# Patient Record
Sex: MALE | Race: WHITE
[De-identification: names, ages, dates, MRNs, and addresses within clinical notes are randomized per-mention and may not be internally consistent; named-entity substitution may affect disease eponyms.]

---

## 2018-02-09 ENCOUNTER — HOSPITAL ENCOUNTER (OUTPATIENT)
Dept: HOSPITAL 38 - CC.SDS | Age: 68
End: 2018-02-09
Attending: FAMILY MEDICINE
Payer: MEDICARE

## 2018-02-09 VITALS — SYSTOLIC BLOOD PRESSURE: 143 MMHG | DIASTOLIC BLOOD PRESSURE: 77 MMHG

## 2018-02-09 DIAGNOSIS — Z86.010: ICD-10-CM

## 2018-02-09 DIAGNOSIS — Z12.11: Primary | ICD-10-CM

## 2018-02-09 DIAGNOSIS — I10: ICD-10-CM

## 2018-02-09 DIAGNOSIS — Z79.899: ICD-10-CM

## 2018-02-09 DIAGNOSIS — K57.30: ICD-10-CM

## 2018-02-09 DIAGNOSIS — Z72.0: ICD-10-CM

## 2018-02-09 DIAGNOSIS — R00.1: ICD-10-CM

## 2018-02-09 DIAGNOSIS — Z79.82: ICD-10-CM

## 2018-02-09 DIAGNOSIS — N40.0: ICD-10-CM

## 2018-02-09 DIAGNOSIS — K63.5: ICD-10-CM

## 2018-02-12 NOTE — OR
DATE OF OPERATION:  02/09/2018

 

PREOPERATIVE DIAGNOSIS:  FOLLOW UP POLYPS.

 

POSTOPERATIVE DIAGNOSIS:  FOLLOW UP POLYPS.

 

SURGEON:  Migue Willard MD

 

PROCEDURE:  FULL-LENGTH COLONOSCOPY.

 

ANESTHESIA:  CRNA.

 

COMPLICATIONS:  None.

 

SPECIMEN:  None.

 

FINDINGS:

1. Full-length colonoscopy.

2. Moderate sigmoid diverticulosis.

3. Tubulovillous adenoma, cecum.

 

RECOMMENDATIONS:  It was a difficult patient with a difficult scope.  We were

having a hard time keeping the scope in the cecal pouch to safely remove this

larger villous adenoma.  There was no stalk on this and felt to be safer to

remove via Dr. Yu.

 

INDICATIONS:  The patient had a prior colonoscopy and had a larger villous

adenoma removed in his sigmoid.  He is in for a 2-year follow up.

 

DESCRIPTION OF PROCEDURE:  The patient was prepped and draped, placed in left

lateral decubitus position.  A lubricated Olympus colonoscope was inserted and

relatively easily advanced to the cecum.  The patient does have a long and

tortuous colon.  We were able to get right up to the cecum and visualize greater

than 1 cm tubulovillous adenoma in the cecal pouch, but we had to put him in a

couple of different positions to get the scope into the pouch itself.  We were

having a hard time holding the scope there, to insert snare for retrieval.  The

patient was starting to have some bradycardia due to abdominal pressure, and we

felt unsafe to remove this in [place], and we elected to leave this for a

higher level of care.  The ascending colon was benign.  There were a few small

hyperplastic polyps right at the hepatic flexure, which were left at this time.

Transverse and descending colons unremarkable.  The patient does have scattered

diverticula, moderate in severity, in the sigmoid region without any other

lesions seen.  No vascular abnormalities, signs of colitis, or bleeding sites.

Rectal vault was

benign.  Retroflexion showed no anal lesions.  Air was suctioned and scope was

removed without complication.

 

MARTHA/YOSHI

DD:  02/09/2018 09:42:24

DT:  02/09/2018 15:51:01

Job #:  [id number]

## 2019-03-01 ENCOUNTER — HOSPITAL ENCOUNTER (OUTPATIENT)
Dept: HOSPITAL 38 - CC.SDS | Age: 69
End: 2019-03-01
Attending: FAMILY MEDICINE
Payer: MEDICARE

## 2019-03-01 VITALS — DIASTOLIC BLOOD PRESSURE: 64 MMHG | SYSTOLIC BLOOD PRESSURE: 104 MMHG

## 2019-03-01 DIAGNOSIS — I10: ICD-10-CM

## 2019-03-01 DIAGNOSIS — E78.5: ICD-10-CM

## 2019-03-01 DIAGNOSIS — I48.91: ICD-10-CM

## 2019-03-01 DIAGNOSIS — D12.3: ICD-10-CM

## 2019-03-01 DIAGNOSIS — K57.30: ICD-10-CM

## 2019-03-01 DIAGNOSIS — Z79.899: ICD-10-CM

## 2019-03-01 DIAGNOSIS — D12.2: Primary | ICD-10-CM

## 2019-03-01 DIAGNOSIS — Z86.010: ICD-10-CM

## 2019-03-01 NOTE — OR
DATE OF OPERATION:  03/01/2019

 

PREOPERATIVE DIAGNOSIS:  FOLLOW UP POLYPS.

 

POSTOPERATIVE DIAGNOSIS:  FOLLOW UP POLYPS.

 

SURGEON:  Migue Willard MD

 

PROCEDURE:  DIAGNOSTIC COLONOSCOPY WITH SNARE POLYPECTOMY X2.

 

ANESTHESIA:  CRNA.

 

COMPLICATIONS:  None.

 

SPECIMEN:  Villous adenomas, right colon x2.

 

FINDINGS:

1. Full-length colonoscopy.

2. Mild-to-moderate sigmoid diverticulosis.

3. Villous adenomas, less than a cm, right colon x2.

 

RECOMMENDATIONS:  Followup colonoscopy in 3 years.

 

INDICATIONS:  The patient had a prior colonoscopy with multiple polyps removed

by GI in the right colon including a large one in the cecum.  They recommended a

1-year followup.

 

DESCRIPTION OF PROCEDURE:  The patient was prepped and draped, placed in left

lateral decubitus position.  A lubricated Olympus colonoscope was inserted and

with ease advanced to the cecum.  We were able to directly visualize the

ileocecal valve and appendiceal orifice.  The bowel prep was fine.  Upon

withdrawal, the cecal pouch looked fine, I did not see any polyp recurrence in

that area.  In the proximal ascending colon, the patient had a flat villous

adenoma, approximately a cm in size, removed with a snare, and suctioned into

polyp trap #1.  He had a 2nd villous lesion, smaller, about 0.5 cm in the mid

ascending colon, also removed with a snare and suctioned into polyp trap #2.

The rest of the right and transverse colons were unremarkable.  Left colon had

diffuse diverticulosis, mild-to-moderate in severity, mostly in the sigmoid and

rectosigmoid junction.  No signs of any further polyps, mass, ulceration, or

bleeding sites.  No vascular abnormalities or signs of colitis.  The rectal

vault was unremarkable.

Retroflexion of the scope in the rectum showed no anal lesions.  Air was

suctioned, the scope removed without complication.

 

MARTHA/YOSHI

DD:  03/01/2019 08:50:21

DT:  03/01/2019 12:26:10

Job #:  [id number]

## 2019-04-16 ENCOUNTER — HOSPITAL ENCOUNTER (EMERGENCY)
Dept: HOSPITAL 38 - CC.ED | Age: 69
Discharge: HOME | End: 2019-04-16
Payer: MEDICARE

## 2019-04-16 VITALS — SYSTOLIC BLOOD PRESSURE: 154 MMHG | DIASTOLIC BLOOD PRESSURE: 89 MMHG

## 2019-04-16 DIAGNOSIS — S00.01XA: ICD-10-CM

## 2019-04-16 DIAGNOSIS — R55: Primary | ICD-10-CM
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
## 2019-04-16 DIAGNOSIS — Z79.899: ICD-10-CM

## 2019-04-16 DIAGNOSIS — Z79.82: ICD-10-CM

## 2019-04-16 LAB
CHLORIDE SERPL-SCNC: 103 MEQ/L (ref 98–106)
SODIUM SERPL-SCNC: 141 MEQ/L (ref 136–145)

## 2019-04-16 NOTE — EDM.PDOC
ED HPI GENERAL MEDICAL PROBLEM





- General


Stated Complaint: PASSED OUT


Time Seen by Provider: 04/16/19 12:34





- History of Present Illness


INITIAL COMMENTS - FREE TEXT/NARRATIVE: 





Duane is a 69 year old male who presents to the ED via EMS. Reports he was at 

an auction sale this morning and was standing still for about 1.5 hours. He 

reports his legs started to get weak so he went to sit down on a tractor hitch/

wheel. Reports the next thing he knows he woke up on the ground. Bystanders 

reports brief LOC. He did fall and hit his head and RUE on the tractor. Has 

hematoma to right temporal area and abrasion to RUE. He denies any complaints 

upon presentation to ED. He denies any dizziness or chest pain prior to 

syncopal episode. He denies any headache, pain, N/V/D, chest pain, cough, 

shortness of breath. He reports he is feeling well and has no complaints. 





Patient received 1 L NS fluid bolus enroute via EMS. 








Onset: Today, Sudden


Onset Date: 04/16/19


Onset Time: 11:00


Duration: Resolved Prior to Arrival


Associated Symptoms: Reports: Syncope.  Denies: Confusion, Chest Pain, Cough, 

cough w sputum, Diaphoresis, Fever/Chills, Headaches, Loss of Appetite, Malaise

, Nausea/Vomiting, Rash, Seizure, Shortness of Breath, Weakness


Treatments PTA: Reports: See EMS Report





- Related Data


 Allergies











Allergy/AdvReac Type Severity Reaction Status Date / Time


 


No Known Allergies Allergy   Verified 03/01/19 07:24











Home Meds: 


 Home Meds





Aspirin [Halfprin] 81 mg PO DAILY 04/17/15 [History]


Cranberry Extract [Cranberry] 250 mg PO DAILY 04/17/15 [History]


Metoprolol Tartrate 50 mg PO BID 02/09/18 [History]


Lisinopril/Hydrochlorothiazide [Lisinopril-Hctz 20-25 mg Tab] 1.5 each PO DAILY 

02/28/19 [History]


amLODIPine Besylate [Amlodipine Besylate] 10 mg PO DAILY 02/28/19 [History]











ED ROS GENERAL





- Review of Systems


Review Of Systems: ROS reveals no pertinent complaints other than HPI.





- Physical Exam


Exam: See Below


Exam Limited By: No Limitations


General Appearance: Alert, WD/WN, No Apparent Distress


Eye Exam: Bilateral Eye: EOMI, Normal Fundi, Normal Inspection, PERRL


Ears: Normal External Exam, Normal Canal, Hearing Grossly Normal, Normal TMs


Nose: Normal Inspection, Normal Mucosa, No Blood


Throat/Mouth: Normal Inspection, Normal Lips, Normal Teeth, Normal Gums, Normal 

Oropharynx, Normal Voice, No Airway Compromise


Head Exam: Normocephalic, Scalp Abrasions (left temporal area), Scalp Hematoma (

left temporal area)


Neck: Normal Inspection, Supple, Non-Tender, Full Range of Motion


Respiratory/Chest: No Respiratory Distress, Lungs Clear, Normal Breath Sounds, 

No Accessory Muscle Use, Chest Non-Tender


Cardiovascular: Normal Peripheral Pulses, Regular Rate, Rhythm, No Edema, No 

Gallop, No JVD, No Murmur, No Rub


GI/Abdominal: Normal Bowel Sounds, Soft, Non-Tender, No Organomegaly, No 

Distention, No Abnormal Bruit, No Mass, Hernia (umbilical)


Neuro Exam (Abbreviated): Alert, Oriented, CN II-XII Intact, Normal Cognition, 

Normal Gait, Normal Reflexes, No Motor/Sensory Deficits


Back Exam: Normal Inspection, Full Range of Motion, NT


Extremities: Normal Inspection, Normal Range of Motion, Non-Tender, No Pedal 

Edema, Normal Capillary Refill


Psychiatric: Normal Affect, Normal Mood





Course





- Vital Signs


Last Recorded V/S: 


 Last Vital Signs











Temp  98.9 F   04/16/19 12:10


 


Pulse  73   04/16/19 12:10


 


Resp  20   04/16/19 12:10


 


BP  154/89 H  04/16/19 12:10


 


Pulse Ox  98   04/16/19 12:10














- Orders/Labs/Meds


Orders: 


 Active Orders 24 hr











 Category Date Time Status


 


 Head wo Cont [CT] Stat Exams  04/16/19 12:44 Taken











Labs: 


 Laboratory Tests











  04/16/19 04/16/19 04/16/19 Range/Units





  12:26 12:26 12:26 


 


WBC  12.3 H    (5.0-10.0)  10^3/uL


 


RBC  5.19    (4.50-6.00)  10^6/uL


 


Hgb  15.7    (14.0-18.0)  g/dL


 


Hct  45.7    (40.0-54.0)  %


 


MCV  88.1    (82.0-94.0)  fL


 


MCH  30.3    (27.0-32.0)  pg


 


MCHC  34.4    (33.0-38.0)  g/dL


 


RDW Coeff of Alex  13.5    (11.0-15.0)  %


 


Plt Count  197    (150-400)  10^3/uL


 


Neut % (Auto)  85.6 H    (35-85)  %


 


Lymph % (Auto)  5.9 L    (10-55)  %


 


Mono % (Auto)  6.8    (0-16)  %


 


Eos % (Auto)  1.5    (0-5)  %


 


Baso % (Auto)  0.2    (0-3)  %


 


Neut # (Auto)  10.51 H    (1.80-7.00)  10^3/uL


 


Lymph # (Auto)  0.73 L    (1.00-4.80)  10^3/uL


 


Mono # (Auto)  0.84 H    (0.00-0.80)  10^3/uL


 


Eos # (Auto)  0.18    (0.00-0.45)  10^3/uL


 


Baso # (Auto)  0.02    10^3/uL


 


Sodium   141   (136-145)  mEq/L


 


Potassium   3.5   (3.5-5.0)  mEq/L


 


Chloride   103   ()  mEq/L


 


Carbon Dioxide   30   (21-32)  mmol/L


 


BUN   25 H   (7-18)  mg/dL


 


Creatinine   1.2   (0.7-1.3)  mg/dL


 


Est Cr Clr Drug Dosing   65.66   mL/min


 


Estimated GFR (MDRD)   > 60   (>=60)  mL/min


 


Glucose   119 H   (75-99)  mg/dL


 


Calcium   9.2   (8.4-10.1)  mg/dL


 


Total Bilirubin   0.6   (0.0-1.0)  mg/dL


 


AST   19   (15-37)  U/L


 


ALT   25   (12-78)  U/L


 


Alkaline Phosphatase   72   ()  U/L


 


Troponin I   < 0.017   (0.00-0.06)  ng/mL


 


Total Protein   7.0   (6.4-8.2)  g/dL


 


Albumin   3.5   (3.4-5.0)  g/dL


 


TSH, Ultra Sensitive   3.17   (0.36-5.60)  uIU/mL


 


Urine Color    Yellow  (YELLOW)  


 


Urine Appearance    Clear  (CLEAR)  


 


Urine pH    6.5  (4.5-8.0)  


 


Ur Specific Gravity    1.020  (1.003-1.020)  


 


Urine Protein    Negative  (NEGATIVE)  mg/dL


 


Urine Glucose (UA)    Negative  (NEGATIVE)  mg/dL


 


Urine Ketones    Negative  (NEGATIVE)  mg/dL


 


Urine Occult Blood    Negative  (NEGATIVE)  


 


Urine Nitrite    Negative  (NEGATIVE)  


 


Urine Bilirubin    Negative  (NEGATIVE)  


 


Urine Urobilinogen    0.2  (0.2-1.0)  EU/dL


 


Ur Leukocyte Esterase    Negative  (NEGATIVE)  














- Radiology Interpretation


Free Text/Narrative:: 





Head CT negative for acute changes. 


CT Results Date: 04/16/19


CT Results Time: 14:42





- Re-Assessments/Exams


Free Text/Narrative Re-Assessment/Exam: 





04/16/19 13:45


Discussed lab results with patient and wife. Labs all stable. Awaiting CT 

results. Patient has no complaints and is feeling well. Cardiac monitoring 

remains NSR. 





04/16/19 14:42


Discussed head CT results and discharge recommendations with patient and wife. 

Patient doing well. No complaints. 





Departure





- Departure


Time of Disposition: 14:40


Disposition: Home, Self-Care 01


Condition: Good


Clinical Impression: 


 Vasovagal syncope








- Discharge Information


*PRESCRIPTION DRUG MONITORING PROGRAM REVIEWED*: Not Applicable


*COPY OF PRESCRIPTION DRUG MONITORING REPORT IN PATIENT EMANUEL: Not Applicable


Referrals: 


Cordell Steward PA-C [Primary Care Provider] - 


Forms:  ED Department Discharge


Additional Instructions: 


1) Take it easy the next few days


2) Drink at least 64 ounces water daily


3) Avoid standing still for excessive time periods


4) Follow up with Yosef MAXWELL in next 5-7 days 





- My Orders


Last 24 Hours: 


My Active Orders





04/16/19 12:44


Head wo Cont [CT] Stat 














- Assessment/Plan


Last 24 Hours: 


My Active Orders





04/16/19 12:44


Head wo Cont [CT] Stat 











Plan: 





Patient received 1 L fluid bolus. Was asymptomatic throughout ED stay. 

Discussed with patient and wife that he likely had vasovagal syncopal episode 

as he was standing still for extended period of time. All workup normal. Head 

CT negative. Recommended patient follow up outpatient with PCP for further 

workup in next 5-7 days. Patient and wife verbalized understanding. Patient 

discharged from facility in satisfactory condition. Ambulatory at time of 

discharge.

## 2019-06-06 ENCOUNTER — HOSPITAL ENCOUNTER (OUTPATIENT)
Dept: HOSPITAL 38 - CC.SDS | Age: 69
End: 2019-06-06
Payer: MEDICARE

## 2019-06-06 VITALS — DIASTOLIC BLOOD PRESSURE: 88 MMHG | SYSTOLIC BLOOD PRESSURE: 136 MMHG

## 2019-06-06 DIAGNOSIS — Z86.79: ICD-10-CM

## 2019-06-06 DIAGNOSIS — G56.01: Primary | ICD-10-CM

## 2019-06-06 DIAGNOSIS — Z79.899: ICD-10-CM

## 2019-06-06 DIAGNOSIS — E78.5: ICD-10-CM

## 2019-06-06 DIAGNOSIS — Z79.82: ICD-10-CM

## 2019-06-06 DIAGNOSIS — I10: ICD-10-CM

## 2019-06-06 PROCEDURE — 64721 CARPAL TUNNEL SURGERY: CPT

## 2019-06-06 NOTE — OR
DATE OF OPERATION:  06/06/2019

 

PREOPERATIVE DIAGNOSIS:  RIGHT CARPAL TUNNEL SYNDROME.

 

POSTOPERATIVE DIAGNOSIS:  RIGHT CARPAL TUNNEL SYNDROME.

 

SURGEON:  Tarik Comer MD

 

PROCEDURE:  RELEASE OF RIGHT CARPAL TUNNEL SYNDROME.

 

ANESTHESIA:  [place] block and local anesthesia.

 

SPECIMEN:  None.

 

INDICATIONS:  This 69-year-old male has had bilateral carpal tunnel syndrome and

fairly good relief after the left one has been released.  He now has more

difficulty with the right one.

 

DESCRIPTION OF PROCEDURE:  After adequate preparation, a longitudinal incision

was made along the median palm crease at the wrist and taken down to the

transverse carpal ligament.  This was sharply incised with a 15 blade and using

sharp dissection and a small Metzenbaum scissor, the entire transverse carpal

ligament was transected.  This was then freed up from the median nerve

underneath slightly.  No injury to the nerve was noted.  There was almost no

bleeding.  Local 0.25% Marcaine was used to infiltrate the transverse carpal

ligament.  The wound was closed using interrupted 2-0 nylon sutures, which will

be removed in 2 weeks.  Local was then infiltrated into the skin.

 

BPB/MODL

DD:  06/06/2019 11:37:15

DT:  06/06/2019 17:33:51

Job #:  [id number]

## 2020-01-08 NOTE — EDM.PDOC
ED HPI GENERAL MEDICAL PROBLEM





- General


Chief Complaint: General


Stated Complaint: passed out


Time Seen by Provider: 01/08/20 20:33


Source of Information: Reports: Patient, Family


History Limitations: Reports: No Limitations





- History of Present Illness


INITIAL COMMENTS - FREE TEXT/NARRATIVE: 





Patient presents to ER per EMS after a syncopal episode at home.  Patient had 

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 

sump pump.  Was doing fine at that time but admits was "tired".  Came upstairs 

to take a shower and eat supper.  He was sitting at the table when he told his 

wife he wasn't feeling well.  Was on a stool and leaning down on his elbows.  

Not responding much verbally to wife and arm was shaking.  She proceeded to 

call 911 and while doing so, fell to the floor and was "out" for a few seconds.

  EMS noted patient in atrial fib.  Has history of paroxysmal atrial fib.  Has 

been doing well for over a year, currently not on any medications for it but 

noted to be on metoprolol.  Family reports that was in [facility] back in 

2017 when diagnosed.  Rate was "high but he never even noted it".  





patient currently feels well.  Denies any chest pain, shortness of breath or 

nausea.  No lightheadedness.  Has had a mild URI as of late with a cough.  No 

fevers.  


Onset: Today, Sudden


Duration: Minutes:


Location: Reports: Head


Associated Symptoms: Reports: Cough, Syncope.  Denies: Confusion, Chest Pain, 

Fever/Chills, Loss of Appetite, Nausea/Vomiting, Shortness of Breath





- Related Data


 Allergies











Allergy/AdvReac Type Severity Reaction Status Date / Time


 


No Known Allergies Allergy   Verified 06/06/19 10:11











Home Meds: 


 Home Meds





Aspirin [Halfprin] 81 mg PO DAILY 04/17/15 [History]


Cranberry Fruit Extract [Cranberry] 250 mg PO DAILY 04/17/15 [History]


Metoprolol Tartrate 50 mg PO BID 02/09/18 [History]


Lisinopril/Hydrochlorothiazide [Lisinopril-Hctz 20-25 mg Tab] 1.5 each PO DAILY 

02/28/19 [History]


amLODIPine Besylate [Amlodipine Besylate] 10 mg PO DAILY 02/28/19 [History]











Past Medical History


Cardiovascular History: Reports: Afib, Hypertension


Gastrointestinal History: Reports: Bowel Obstruction





- Infectious Disease History


Infectious Disease History: Reports: None





- Past Surgical History


Male  Surgical History: Reports: Prostatectomy





Social & Family History





- Family History


Family Medical History: Noncontributory





- Tobacco Use


Smoking Status *Q: Never Smoker





- Caffeine Use


Caffeine Use: Reports: None





- Recreational Drug Use


Recreational Drug Use: No





ED ROS GENERAL





- Review of Systems


Review Of Systems: See Below


Constitutional: Reports: Fatigue.  Denies: Fever, Chills, Malaise, Weakness, 

Decreased Appetite


HEENT: Denies: Ear Pain, Rhinitis, Sinus Problem, Throat Pain, Vertigo


Respiratory: Reports: Cough.  Denies: Shortness of Breath


Cardiovascular: Reports: Lightheadedness (resolved).  Denies: Chest Pain, Edema


Endocrine: Reports: Fatigue


GI/Abdominal: Denies: Abdominal Pain, Black Stool, Bloody Stool, Nausea, 

Vomiting


: Reports: No Symptoms


Musculoskeletal: Reports: No Symptoms


Skin: Reports: No Symptoms


Neurological: Reports: Syncope, Weakness.  Denies: Dizziness





ED EXAM, GENERAL





- Physical Exam


Exam: See Below


Exam Limited By: No Limitations


General Appearance: Alert, WD/WN, No Apparent Distress


Eye Exam: Bilateral Eye: EOMI, PERRL


Ears: Normal External Exam, Normal TMs


Nose: Normal Inspection, Normal Mucosa, No Blood


Throat/Mouth: Normal Inspection, Normal Oropharynx


Head: Normocephalic


Neck: Normal Inspection, Supple, Non-Tender


Respiratory/Chest: No Respiratory Distress, Lungs Clear, Normal Breath Sounds


Cardiovascular: Irregularly Irregular


GI/Abdominal: Normal Bowel Sounds, Soft, Non-Tender


Extremities: Normal Inspection, No Pedal Edema


Neurological: Alert, Oriented


Skin Exam: Warm, Dry





Course





- Vital Signs


Last Recorded V/S: 





 Last Vital Signs











Temp  97.2 F   01/08/20 20:07


 


Pulse  82   01/08/20 20:52


 


Resp  18   01/08/20 20:52


 


BP  103/66   01/08/20 20:52


 


Pulse Ox  95   01/08/20 20:52














- Orders/Labs/Meds


Orders: 





 Active Orders 24 hr











 Category Date Time Status


 


 EKG Documentation Completion [RC] STAT Care  01/08/20 20:19 Active


 


 Chest 2V [CR] Stat Exams  01/08/20 20:19 Taken


 


 Head wo Cont [CT] Stat Exams  01/08/20 20:19 Taken


 


 UA W/MICROSCOPIC [URIN] Stat Lab  01/08/20 21:03 Ordered











Labs: 





 Laboratory Tests











  01/08/20 01/08/20 01/08/20 Range/Units





  20:32 20:32 20:32 


 


WBC  14.3 H    (5.0-10.0)  10^3/uL


 


RBC  5.11    (4.50-6.00)  10^6/uL


 


Hgb  15.4    (14.0-18.0)  g/dL


 


Hct  44.5    (40.0-54.0)  %


 


MCV  87.1    (82.0-94.0)  fL


 


MCH  30.1    (27.0-32.0)  pg


 


MCHC  34.6    (33.0-38.0)  g/dL


 


RDW Coeff of Alex  13.4    (11.0-15.0)  %


 


Plt Count  249    (150-400)  10^3/uL


 


Neut % (Auto)  83.0    (35-85)  %


 


Lymph % (Auto)  9.7 L    (10-55)  %


 


Mono % (Auto)  5.5    (0-16)  %


 


Eos % (Auto)  1.5    (0-5)  %


 


Baso % (Auto)  0.3    (0-3)  %


 


Neut # (Auto)  11.86 H    (1.80-7.00)  10^3/uL


 


Lymph # (Auto)  1.38    (1.00-4.80)  10^3/uL


 


Mono # (Auto)  0.79    (0.00-0.80)  10^3/uL


 


Eos # (Auto)  0.21    (0.00-0.45)  10^3/uL


 


Baso # (Auto)  0.04    10^3/uL


 


PT   10.4   (9.7-12.3)  SEC


 


INR   1.01   (0.92-1.18)  


 


APTT   23.8   (23.2-32.3)  SEC


 


Sodium    144  (136-145)  mEq/L


 


Potassium    3.0 L  (3.5-5.0)  mEq/L


 


Chloride    105  ()  mEq/L


 


Carbon Dioxide    27  (21-32)  mmol/L


 


BUN    35 H D  (7-18)  mg/dL


 


Creatinine    1.7 H  (0.7-1.3)  mg/dL


 


Est Cr Clr Drug Dosing    46.35  mL/min


 


Estimated GFR (MDRD)    40 L  (>=60)  mL/min


 


Glucose    171 H D  (75-99)  mg/dL


 


Calcium    9.2  (8.4-10.1)  mg/dL


 


Total Bilirubin    0.5  (0.0-1.0)  mg/dL


 


AST    23  (15-37)  U/L


 


ALT    21  (12-78)  U/L


 


Alkaline Phosphatase    62  ()  U/L


 


Lactate Dehydrogenase    158  (100-190)  U/L


 


Creatine Kinase    226  ()  U/L


 


Troponin I    < 0.017  (0.00-0.06)  ng/mL


 


Total Protein    6.5  (6.4-8.2)  g/dL


 


Albumin    3.3 L  (3.4-5.0)  g/dL


 


Lipase    129  ()  U/L











Meds: 





Medications














Discontinued Medications














Generic Name Dose Route Start Last Admin





  Trade Name Freq  PRN Reason Stop Dose Admin


 


Aspirin  324 mg  01/08/20 20:19  





  Aspirin  PO  01/08/20 20:20  





  ONETIME ONE   





     





     





     





     














- Re-Assessments/Exams


Free Text/Narrative Re-Assessment/Exam: 





01/08/20 2100


Patient's labs noted.  WBC is elevated at 14.3  Chest xray is normal.  Will 

obtain UA.  No fevers.  Potassium 3.0, BUN and creatinine are high, start IV 

fluids with potassium.  Admit to observation.  Follow telemetry.  Repeat 

enzymes in am.  





Departure





- Departure


Time of Disposition: 21:32


Disposition: Refer to Observation


Condition: Fair


Clinical Impression: 


Syncope


Qualifiers:


 Encounter type: initial encounter 








- Discharge Information


*PRESCRIPTION DRUG MONITORING PROGRAM REVIEWED*: Not Applicable


*COPY OF PRESCRIPTION DRUG MONITORING REPORT IN PATIENT EMANUEL: Not Applicable


Referrals: 


PCP,None [Primary Care Provider] - 





Sepsis Event Note





- Evaluation


Sepsis Screening Result: No Definite Risk





- Focused Exam


Vital Signs: 





 Vital Signs











  Temp Pulse Resp BP Pulse Ox


 


 01/08/20 20:52   82  18  103/66  95


 


 01/08/20 20:37   82  16  94/60  96


 


 01/08/20 20:22   83  16  100/66  95


 


 01/08/20 20:07  97.2 F  95  19  110/65  95











Date Exam was Performed: 01/08/20


Time Exam was Performed: 21:13





- Problem List & Annotations


(1) Syncope


SNOMED Code(s): 004367670


   Code(s): R55 - SYNCOPE AND COLLAPSE   Status: Acute   Priority: High   

Current Visit: Yes   


Qualifiers: 


   Encounter type: initial encounter 





- Problem List Review


Problem List Initiated/Reviewed/Updated: Yes





- My Orders


Last 24 Hours: 





My Active Orders





01/08/20 20:19


EKG Documentation Completion [RC] STAT 


Chest 2V [CR] Stat 


Head wo Cont [CT] Stat 





01/08/20 21:03


UA W/MICROSCOPIC [URIN] Stat 














- Assessment/Plan


Admission H&P: Please use this note as an admission H&P


Last 24 Hours: 





My Active Orders





01/08/20 20:19


EKG Documentation Completion [RC] STAT 


Chest 2V [CR] Stat 


Head wo Cont [CT] Stat 





01/08/20 21:03


UA W/MICROSCOPIC [URIN] Stat 











Assessment:: 





Syncope





Plan: 





Patient admitted observation.  Start IV fluids with potassium.  Cardiac 

monitoring.  Repeat labs in am.

## 2020-01-09 NOTE — PCM.DCSUM1
**Discharge Summary





- Hospital Course


Free Text/Narrative:: 





Duane is a 69 year old male who presented to ER after a syncopal episode at 

home.  Had been working down in his basement, chiseling cement.  Had come up to 

shower and was sitting at the table eating supper when wife noted he got pale, 

wasn't responding as well.  Stated wasn't feeling well.  Laid his head down on 

the table and when she reached the phone to call 911, he fell to the floor and 

was unresponsive for several seconds.  He awoke without incident, no head 

trauma.  States felt fine when he awoke.  No loss of urine or bowel.  Admits 

that his hand was shaking and his breathing was sonorous for a few minutes but 

oriented upon awakening.  EMS on arrival did note heart rate of 85, atrial fib.

  Patient has history of paroxysmal atrial fib, was transferred in 2017 with 

first onset of it at that time.  Had taken Xarelto for a short time but wore a 

monitor and was more consistently back in sinus rhythm so went off Xarelto and 

has been taking aspirin.  Labs in ER negative.  EKG atrial fib with controlled 

rate.  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  Admitted for observation, 

neurology consult.  Per neurologist, recommended treating atrial fib and 

obtaining CTA of head and neck and MRI of brain as did not feel CT scan was 

conclusive for CVA.  Neurological exam is normal, NIH score 0.


Diagnosis: Stroke: No


Modified Kandis Scale: No Symptoms at All


Modified Kandis Scale Score: 0





- Discharge Data


Discharge Date: 01/09/20


Discharge Disposition: Home, Self-Care 01


Condition: Good





- Referral to Home Health


Primary Care Physician: 


PCP None








- Discharge Diagnosis/Problem(s)


(1) Syncope


SNOMED Code(s): 842349086


   ICD Code: R55 - SYNCOPE AND COLLAPSE   Status: Acute   Priority: High   


Qualifiers: 


   Encounter type: initial encounter 





- Patient Summary/Data


Complications: none


Hospital Course: 





Patient doing well.  Did convert back to NSR.  Continues on Eliquis at this time

, metoprolol to control rate.  Had CTAs done this am.  Neurological exam has 

remained normal.  Blood pressure in good control.  Ambulating, eating well.  

Will discharge home, return tomorrow for MRI.  Follow up with Yosef next week 

for all results.





- Patient Instructions


Diet: Usual Diet as Tolerated


Activity: As Tolerated





- Discharge Plan


*PRESCRIPTION DRUG MONITORING PROGRAM REVIEWED*: Not Applicable


*COPY OF PRESCRIPTION DRUG MONITORING REPORT IN PATIENT EMANUEL: Not Applicable


Prescriptions/Med Rec: 


Apixaban [Eliquis] 5 mg PO BID #60 tablet


Potassium Chloride 10 meq PO DAILY #30 tablet.er


Home Medications: 


 Home Meds





Aspirin [Halfprin] 81 mg PO DAILY 04/17/15 [History]


Cranberry Fruit Extract [Cranberry] 250 mg PO DAILY 04/17/15 [History]


Metoprolol Tartrate 50 mg PO BID 02/09/18 [History]


Lisinopril/Hydrochlorothiazide [Lisinopril-Hctz 20-25 mg Tab] 1.5 each PO DAILY 

02/28/19 [History]


amLODIPine Besylate [Amlodipine Besylate] 10 mg PO DAILY 02/28/19 [History]


Apixaban [Eliquis] 5 mg PO BID #60 tablet 01/09/20 [Rx]


Potassium Chloride 10 meq PO DAILY #30 tablet.er 01/09/20 [Rx]








Patient Handouts:  Hypokalemia, Syncope


Forms:  ED Department Discharge


Referrals: 


Cordell Steward PA-C [Physician Assistant] - 


(Hospital follow up in one week with Yosef Steward.  Repeat labs prior to 

appointment


)





- Discharge Summary/Plan Comment


DC Time >30 min.: No





- General Info


Date of Service: 01/09/20


Admission Dx/Problem (Free Text: 





Syncope


Atrial Fib


Functional Status: Reports: Pain Controlled, Tolerating Diet, Ambulating





- Review of Systems


General: Reports: No Symptoms


HEENT: Reports: No Symptoms


Pulmonary: Denies: Shortness of Breath


Cardiovascular: Denies: Chest Pain, Edema, Lightheadedness


Gastrointestinal: Denies: Abdominal Pain, Nausea, Vomiting


Genitourinary: Reports: No Symptoms


Musculoskeletal: Reports: No Symptoms


Skin: Reports: No Symptoms


Neurological: Denies: Dizziness, Headache, Syncope, Weakness


Psychiatric: Reports: No Symptoms





- Patient Data


Vitals - Most Recent: 


 Last Vital Signs











Temp  97.9 F   01/09/20 08:00


 


Pulse  75   01/09/20 08:13


 


Resp  18   01/09/20 08:00


 


BP  125/72   01/09/20 08:13


 


Pulse Ox  96   01/09/20 08:00











Weight - Most Recent: 245 lb 11.2 oz


I&O - Last 24 hours: 


 Intake & Output











 01/08/20 01/09/20 01/09/20





 22:59 06:59 14:59


 


Intake Total  969 


 


Balance  969 











Lab Results - Last 24 hrs: 


 Laboratory Results - last 24 hr











  01/08/20 01/08/20 01/08/20 Range/Units





  20:32 20:32 20:32 


 


WBC  14.3 H    (5.0-10.0)  10^3/uL


 


RBC  5.11    (4.50-6.00)  10^6/uL


 


Hgb  15.4    (14.0-18.0)  g/dL


 


Hct  44.5    (40.0-54.0)  %


 


MCV  87.1    (82.0-94.0)  fL


 


MCH  30.1    (27.0-32.0)  pg


 


MCHC  34.6    (33.0-38.0)  g/dL


 


RDW Coeff of Alex  13.4    (11.0-15.0)  %


 


Plt Count  249    (150-400)  10^3/uL


 


Neut % (Auto)  83.0    (35-85)  %


 


Lymph % (Auto)  9.7 L    (10-55)  %


 


Mono % (Auto)  5.5    (0-16)  %


 


Eos % (Auto)  1.5    (0-5)  %


 


Baso % (Auto)  0.3    (0-3)  %


 


Neut # (Auto)  11.86 H    (1.80-7.00)  10^3/uL


 


Lymph # (Auto)  1.38    (1.00-4.80)  10^3/uL


 


Mono # (Auto)  0.79    (0.00-0.80)  10^3/uL


 


Eos # (Auto)  0.21    (0.00-0.45)  10^3/uL


 


Baso # (Auto)  0.04    10^3/uL


 


PT   10.4   (9.7-12.3)  SEC


 


INR   1.01   (0.92-1.18)  


 


APTT   23.8   (23.2-32.3)  SEC


 


Sodium    144  (136-145)  mEq/L


 


Potassium    3.0 L  (3.5-5.0)  mEq/L


 


Chloride    105  ()  mEq/L


 


Carbon Dioxide    27  (21-32)  mmol/L


 


BUN    35 H D  (7-18)  mg/dL


 


Creatinine    1.7 H  (0.7-1.3)  mg/dL


 


Est Cr Clr Drug Dosing    46.35  mL/min


 


Estimated GFR (MDRD)    40 L  (>=60)  mL/min


 


Glucose    171 H D  (75-99)  mg/dL


 


Calcium    9.2  (8.4-10.1)  mg/dL


 


Total Bilirubin    0.5  (0.0-1.0)  mg/dL


 


AST    23  (15-37)  U/L


 


ALT    21  (12-78)  U/L


 


Alkaline Phosphatase    62  ()  U/L


 


Lactate Dehydrogenase    158  (100-190)  U/L


 


Creatine Kinase    226  ()  U/L


 


Troponin I    < 0.017  (0.00-0.06)  ng/mL


 


C-Reactive Protein     (0.2-0.8)  mg/dL


 


Total Protein    6.5  (6.4-8.2)  g/dL


 


Albumin    3.3 L  (3.4-5.0)  g/dL


 


Lipase    129  ()  U/L


 


Urine Color     (YELLOW)  


 


Urine Appearance     (CLEAR)  


 


Urine pH     (4.5-8.0)  


 


Ur Specific Gravity     (1.003-1.020)  


 


Urine Protein     (NEGATIVE)  mg/dL


 


Urine Glucose (UA)     (NEGATIVE)  mg/dL


 


Urine Ketones     (NEGATIVE)  mg/dL


 


Urine Occult Blood     (NEGATIVE)  


 


Urine Nitrite     (NEGATIVE)  


 


Urine Bilirubin     (NEGATIVE)  


 


Urine Urobilinogen     (0.2-1.0)  EU/dL


 


Ur Leukocyte Esterase     (NEGATIVE)  


 


Urine RBC     (0-5)  /HPF


 


Urine WBC     (0-5)  /HPF


 


Hyaline Casts     (NOT SEEN)  /LPF


 


Urine Mucus     (NOT SEEN)  /HPF














  01/08/20 01/09/20 01/09/20 Range/Units





  21:03 07:10 07:10 


 


WBC   7.1   (5.0-10.0)  10^3/uL


 


RBC   4.76   (4.50-6.00)  10^6/uL


 


Hgb   14.3   (14.0-18.0)  g/dL


 


Hct   41.9   (40.0-54.0)  %


 


MCV   88.0   (82.0-94.0)  fL


 


MCH   30.0   (27.0-32.0)  pg


 


MCHC   34.1   (33.0-38.0)  g/dL


 


RDW Coeff of Alex   13.4   (11.0-15.0)  %


 


Plt Count   215   (150-400)  10^3/uL


 


Neut % (Auto)   67.2   (35-85)  %


 


Lymph % (Auto)   21.4   (10-55)  %


 


Mono % (Auto)   8.5   (0-16)  %


 


Eos % (Auto)   2.5   (0-5)  %


 


Baso % (Auto)   0.4   (0-3)  %


 


Neut # (Auto)   4.77   (1.80-7.00)  10^3/uL


 


Lymph # (Auto)   1.52   (1.00-4.80)  10^3/uL


 


Mono # (Auto)   0.60   (0.00-0.80)  10^3/uL


 


Eos # (Auto)   0.18   (0.00-0.45)  10^3/uL


 


Baso # (Auto)   0.03   10^3/uL


 


PT     (9.7-12.3)  SEC


 


INR     (0.92-1.18)  


 


APTT     (23.2-32.3)  SEC


 


Sodium    143  (136-145)  mEq/L


 


Potassium    3.2 L  (3.5-5.0)  mEq/L


 


Chloride    108 H  ()  mEq/L


 


Carbon Dioxide    28  (21-32)  mmol/L


 


BUN    28 H  (7-18)  mg/dL


 


Creatinine    1.3  (0.7-1.3)  mg/dL


 


Est Cr Clr Drug Dosing    60.61  mL/min


 


Estimated GFR (MDRD)    55 L  (>=60)  mL/min


 


Glucose    94  D  (75-99)  mg/dL


 


Calcium    8.5  (8.4-10.1)  mg/dL


 


Total Bilirubin     (0.0-1.0)  mg/dL


 


AST     (15-37)  U/L


 


ALT     (12-78)  U/L


 


Alkaline Phosphatase     ()  U/L


 


Lactate Dehydrogenase     (100-190)  U/L


 


Creatine Kinase     ()  U/L


 


Troponin I    < 0.017  (0.00-0.06)  ng/mL


 


C-Reactive Protein    < 0.2 L  (0.2-0.8)  mg/dL


 


Total Protein     (6.4-8.2)  g/dL


 


Albumin     (3.4-5.0)  g/dL


 


Lipase     ()  U/L


 


Urine Color  Yellow    (YELLOW)  


 


Urine Appearance  Clear    (CLEAR)  


 


Urine pH  5.5    (4.5-8.0)  


 


Ur Specific Gravity  1.025 H    (1.003-1.020)  


 


Urine Protein  Trace H    (NEGATIVE)  mg/dL


 


Urine Glucose (UA)  Negative    (NEGATIVE)  mg/dL


 


Urine Ketones  Trace H    (NEGATIVE)  mg/dL


 


Urine Occult Blood  Negative    (NEGATIVE)  


 


Urine Nitrite  Negative    (NEGATIVE)  


 


Urine Bilirubin  Negative    (NEGATIVE)  


 


Urine Urobilinogen  0.2    (0.2-1.0)  EU/dL


 


Ur Leukocyte Esterase  Negative    (NEGATIVE)  


 


Urine RBC  Not seen    (0-5)  /HPF


 


Urine WBC  0-5    (0-5)  /HPF


 


Hyaline Casts  Few H    (NOT SEEN)  /LPF


 


Urine Mucus  Few H    (NOT SEEN)  /HPF











Med Orders - Current: 


 Current Medications





Amlodipine Besylate (Norvasc)  10 mg PO DAILY [facility]


   Last Admin: 01/09/20 08:12 Dose:  Not Given


Apixaban (Eliquis)  5 mg PO BID [facility]


   Last Admin: 01/09/20 08:14 Dose:  5 mg


Aspirin (Halfprin)  81 mg PO DAILY [facility]


   Last Admin: 01/09/20 08:15 Dose:  81 mg


Potassium Chloride/Sodium Chloride (Normal Saline With 20 Meq Kcl)  1,000 mls @ 

125 mls/hr IV ASDIRECTED [facility]


   Last Admin: 01/09/20 05:41 Dose:  125 mls/hr


Metoprolol Tartrate (Lopressor)  50 mg PO BID [facility]


   Last Admin: 01/09/20 08:13 Dose:  50 mg


Lisinopril- Hydrochlorothiazide 20-25mg**Own Med**  1.5 each PO DAILY [facility]


   Last Admin: 01/09/20 08:12 Dose:  1.5 each


Ondansetron HCl (Zofran Odt)  4 mg PO Q4H PRN


   PRN Reason: nausea, able to take PO





Discontinued Medications





Amlodipine Besylate (Norvasc)  10 mg PO DAILY [facility]


Aspirin (Aspirin)  324 mg PO ONETIME ONE


   Stop: 01/08/20 20:20


   Last Admin: 01/08/20 20:19 Dose:  324 mg


Enoxaparin Sodium (Lovenox)  40 mg SUBCUT Q24H [facility]


   Last Admin: 01/08/20 21:58 Dose:  40 mg


Hydrochlorothiazide (Hydrochlorothiazide)  37.5 mg PO DAILY [facility]


Iopamidol (Isovue-370 (76%))  100 ml IVPUSH ONETIME ONE


   Stop: 01/09/20 07:49


   Last Admin: 01/09/20 08:36 Dose:  100 ml


Lisinopril (Prinivil)  30 mg PO DAILY [facility]


Metoprolol Tartrate (Lopressor)  50 mg PO BID [facility]











- Exam


General: Reports: Alert, Oriented


HEENT: Reports: Mucous Membr. Moist/Pink


Neck: Reports: Supple


Lungs: Reports: Clear to Auscultation, Normal Respiratory Effort


Cardiovascular: Reports: Regular Rate, Regular Rhythm


GI/Abdominal Exam: Normal Bowel Sounds, Soft, Non-Tender


Extremities: Normal Inspection, No Pedal Edema


Skin: Reports: Warm, Dry


Neurological: Reports: No New Focal Deficit

## 2020-05-07 NOTE — OR
DATE OF OPERATION:  05/07/2020

 

PREOPERATIVE DIAGNOSIS:

1. GASTROESOPHAGEAL REFLUX DISEASE.

2. IRREGULAR HEART TRACING.

 

POSTOPERATIVE DIAGNOSIS:

1. GASTROESOPHAGEAL REFLUX DISEASE.

2. IRREGULAR HEART TRACING.

 

SURGEON:  Tarik Comer MD

 

PROCEDURE:  ESOPHAGOGASTRODUODENOSCOPY.

 

ANESTHESIA:  Conscious sedation with IV Versed.

 

SPECIMEN:  None.

 

FINDINGS:  A 2 to 3 cm hiatal hernia.  A few scattered mid esophageal varices,

otherwise normal.

 

RECOMMENDATIONS:  Follow up for reflux as needed.

 

INDICATIONS:  This 70-year-old male has an unusual symptom in that he has a

cardiac monitor placement, and while eating, this sometimes causes some longer

heart pauses during swallowing.  Cardiology has requested an EGD to make sure

that there is no difficulty with the esophagus causing these cardiac changes

anyway and he also does have some reflux symptoms.

 

DESCRIPTION OF PROCEDURE:  After adequate preparation, a gastroscope was

inserted into the esophagus.  This was passed down to the distal esophagus.  It

shows a 2 to 3 cm hiatal hernia with some eversion of gastric mucosa up into the

hernia sac.  There was no evidence of distal esophagitis, masses, bleeding

sites, tumors, or ulcerations.  The scope was advanced into the stomach.  Both

forward and retroflexed views were done and were normal.  The scope was advanced

through the pylorus, and the first and second parts of the duodenum were also

normal.  Air was suctioned from the stomach and the scope removed.  On

withdrawal of the scope, there was some visible varicosities in the mid

esophagus.  These probably are not significant at all.  There was no distal

esophageal varices, which would be more common.

In any event, I did not find any evidence of structural abnormalities that would

be causing the cardiac symptoms.

 

BPB/MODL

DD:  05/07/2020 08:26:28

DT:  05/07/2020 14:12:06

Job #:  [id number]

## 2021-10-24 NOTE — EDM.PDOC
ED HPI GENERAL MEDICAL PROBLEM





- General


Chief Complaint: Syncope


Stated Complaint: near syncopal event


Time Seen by Provider: 10/24/21 11:08


Source of Information: Reports: Patient, Family


History Limitations: Reports: No Limitations





- History of Present Illness


INITIAL COMMENTS - FREE TEXT/NARRATIVE: 





Duane is a 71 year old male who presents to ER with a near syncopal episode.  

Was standing at [employer], became diaphoretic, color turned "greyish" and felt 

lightheaded.  Has had episodes like this in the past and was found to have 

atrial fib.  Per his pacemaker, was told by Dr. Cantrell that has paroxysmal atrial 

fib and is in it about 10% of the time.  He is not typically aware of when this 

occurs.   No shortness of breath. No chest pain.  Does feel much better now.  

Wife states his color has significantly improved.  Did eat breakfast this am.  

Relates was hot in the [employer] today as well but has been drinking fluids.  


Onset: Today, Sudden


Duration: Minutes:, Improving


Location: Reports: Generalized


Improves with: Reports: Rest


Associated Symptoms: Reports: Diaphoresis, Syncope, Weakness.  Denies: 

Confusion, Chest Pain, Fever/Chills, Headaches, Malaise, Nausea/Vomiting, 

Shortness of Breath





- Related Data


                                    Allergies











Allergy/AdvReac Type Severity Reaction Status Date / Time


 


No Known Allergies Allergy   Verified 10/24/21 11:06











Home Meds: 


                                    Home Meds





Cranberry Fruit Extract [Cranberry] 250 - 500 mg PO DAILY 04/17/15 [History]


Metoprolol Tartrate 100 mg PO BID 02/09/18 [History]


Lisinopril/Hydrochlorothiazide [Lisinopril-Hctz 20-25 mg Tab] 1.5 tab PO DAILY 

02/28/19 [History]


amLODIPine Besylate [Amlodipine Besylate] 10 mg PO DAILY 02/28/19 [History]


Apixaban [Eliquis] 5 mg PO BID #60 tablet 01/09/20 [Rx]


Potassium Chloride 10 meq PO DAILY #30 tablet.er 01/09/20 [Rx]











Past Medical History


Cardiovascular History: Reports: Afib, Hypertension


Gastrointestinal History: Reports: Bowel Obstruction





- Infectious Disease History


Infectious Disease History: Reports: None





- Past Surgical History


Male  Surgical History: Reports: Prostatectomy





Social & Family History





- Family History


Family Medical History: No Pertinent Family History





- Tobacco Use


Tobacco Use Status *Q: Unknown Ever Used Tobacco





- Caffeine Use


Caffeine Use: Reports: None





ED ROS GENERAL





- Review of Systems


Review Of Systems: See Below


Constitutional: Denies: Fever, Chills, Malaise, Weakness, Fatigue


HEENT: Reports: Throat Pain.  Denies: Ear Pain, Rhinitis, Sinus Problem, Vertigo


Respiratory: Reports: Cough.  Denies: Shortness of Breath


Cardiovascular: Reports: Edema, Lightheadedness.  Denies: Chest Pain


Endocrine: Denies: Fatigue


GI/Abdominal: Denies: Abdominal Pain, Constipation, Diarrhea, Nausea, Vomiting


: Reports: No Symptoms


Musculoskeletal: Reports: No Symptoms


Skin: Reports: Diaphoresis


Neurological: Reports: Syncope.  Denies: Confusion, Weakness





- Physical Exam


Exam: See Below


Exam Limited By: No Limitations


General Appearance: Alert, WD/WN, No Apparent Distress


Eye Exam: Bilateral Eye: EOMI, PERRL


Ears: Normal External Exam, Normal TMs


Nose: Normal Inspection, Normal Mucosa, No Blood


Throat/Mouth: Normal Inspection, Normal Oropharynx


Head Exam: Normocephalic


Neck: Normal Inspection, Supple, Non-Tender


Respiratory/Chest: No Respiratory Distress, Lungs Clear, Normal Breath Sounds


Cardiovascular: Irregularly Irregular


GI/Abdominal: Normal Bowel Sounds, Soft, Non-Tender


Neuro Exam (Abbreviated): Alert, Oriented, CN II-XII Intact, Normal Cognition, 

Normal Gait, Normal Reflexes, No Motor/Sensory Deficits


Extremities: Normal Inspection, Pedal Edema (1+)


Skin Exam: Warm, Dry


  ** #1 Interpretation


EKG Date: 10/24/21


Rhythm: A-Fib


P-Wave: Absent


QRS: Normal


ST-T: Normal


Comparison: Change From Previous EKG





Course





- Vital Signs


Last Recorded V/S: 


                                Last Vital Signs











Temp  97.8 F   10/24/21 11:08


 


Pulse  69   10/24/21 11:08


 


Resp  16   10/24/21 11:08


 


BP  114/79   10/24/21 11:08


 


Pulse Ox  95   10/24/21 11:08














- Orders/Labs/Meds


Orders: 


                               Active Orders 24 hr











 Category Date Time Status


 


 Chest 2V [CR] Stat Exams  10/24/21 11:11 Taken


 


 Head wo Cont [CT] Stat Exams  10/24/21 11:11 Taken











Labs: 


                                Laboratory Tests











  10/24/21 10/24/21 10/24/21 Range/Units





  11:10 11:10 11:10 


 


WBC  4.3    (4.0-11.0)  10^3/uL


 


RBC  5.06    (4.50-6.00)  x10^6/uL


 


Hgb  15.5    (14.0-18.0)  g/dL


 


Hct  45.3    (42.0-52.0)  %


 


MCV  89.5    (83.0-97.0)  fL


 


MCH  30.6    (27.0-32.0)  pg


 


MCHC  34.2    (32.0-36.0)  g/dL


 


RDW Coeff of Alex  13.1    (11.0-15.0)  %


 


Plt Count  151    (150-400)  10^3/uL


 


Immature Gran % (Auto)  0.2    (0.0-4.9)  %


 


Neut % (Auto)  62.7    (41-71)  %


 


Lymph % (Auto)  18.0 L    (24-44)  %


 


Mono % (Auto)  16.4 H    (0-10)  %


 


Eos % (Auto)  1.8    (0-6)  %


 


Baso % (Auto)  0.9    (0-1)  %


 


Neut # (Auto)  2.72    (1.80-8.00)  x10^3/uL


 


Lymph # (Auto)  0.78    (0.60-5.00)  10^3/uL


 


Mono # (Auto)  0.71    (0.00-1.50)  10^3/uL


 


Eos # (Auto)  0.08    (0.00-1.50)  10^3/uL


 


Baso # (Auto)  0.04    (0.00-0.50)  10^3/uL


 


Immature Gran # (Auto)  0.01    (0.00-0.49)  10^3/uL


 


D-Dimer, Quantitative     (0.00-0.50)  


 


Sodium   144   (136-145)  mEq/L


 


Potassium   3.5   (3.5-5.0)  mEq/L


 


Chloride   105   ()  mEq/L


 


Carbon Dioxide   29   (21-32)  mmol/L


 


BUN   20 H   (7-18)  mg/dL


 


Creatinine   1.5 H   (0.7-1.3)  mg/dL


 


Est Cr Clr Drug Dosing   TNP   


 


Estimated GFR (MDRD)   46 L   (>=60)  mL/min


 


Glucose   148 H   (75-99)  mg/dL


 


Calcium   9.1   (8.4-10.1)  mg/dL


 


Total Bilirubin   0.5   (0.0-1.0)  mg/dL


 


AST   27   (15-37)  U/L


 


ALT   32   (12-78)  U/L


 


Alkaline Phosphatase   67   ()  U/L


 


Lactate Dehydrogenase   190   (100-190)  U/L


 


Creatine Kinase   61   ()  U/L


 


Troponin I High Sens   8.0   (<=76)  pg/mL


 


C-Reactive Protein   < 0.2 L   (0.2-0.8)  mg/dL


 


Total Protein   6.1 L   (6.4-8.2)  g/dL


 


Albumin   3.0 L   (3.4-5.0)  g/dL


 


Urine Color    Yellow  (YELLOW)  


 


Urine Appearance    Clear  (CLEAR)  


 


Urine pH    5.5  (4.5-8.0)  


 


Ur Specific Gravity    >= 1.030 H  (1.003-1.020)  


 


Urine Protein    30 H  (NEGATIVE)  mg/dL


 


Urine Glucose (UA)    Negative  (NEGATIVE)  mg/dL


 


Urine Ketones    Trace H  (NEGATIVE)  mg/dL


 


Urine Occult Blood    Negative  (NEGATIVE)  


 


Urine Nitrite    Negative  (NEGATIVE)  


 


Urine Bilirubin    Negative  (NEGATIVE)  


 


Urine Urobilinogen    0.2  (0.2-1.0)  EU/dL


 


Ur Leukocyte Esterase    Negative  (NEGATIVE)  


 


U Hyaline Cast (Auto)    Moderate H  (NOT SEEN)  /LPF


 


Urine RBC    Not seen  (0-5)  /HPF


 


Urine WBC    0-5  (0-5)  /HPF


 


Urine Bacteria    Few H  (NOT SEEN)  /HPF


 


Urine Mucus    Moderate H  (NOT SEEN)  /HPF


 


Urinalysis Comment      














  10/24/21 Range/Units





  11:10 


 


WBC   (4.0-11.0)  10^3/uL


 


RBC   (4.50-6.00)  x10^6/uL


 


Hgb   (14.0-18.0)  g/dL


 


Hct   (42.0-52.0)  %


 


MCV   (83.0-97.0)  fL


 


MCH   (27.0-32.0)  pg


 


MCHC   (32.0-36.0)  g/dL


 


RDW Coeff of Alex   (11.0-15.0)  %


 


Plt Count   (150-400)  10^3/uL


 


Immature Gran % (Auto)   (0.0-4.9)  %


 


Neut % (Auto)   (41-71)  %


 


Lymph % (Auto)   (24-44)  %


 


Mono % (Auto)   (0-10)  %


 


Eos % (Auto)   (0-6)  %


 


Baso % (Auto)   (0-1)  %


 


Neut # (Auto)   (1.80-8.00)  x10^3/uL


 


Lymph # (Auto)   (0.60-5.00)  10^3/uL


 


Mono # (Auto)   (0.00-1.50)  10^3/uL


 


Eos # (Auto)   (0.00-1.50)  10^3/uL


 


Baso # (Auto)   (0.00-0.50)  10^3/uL


 


Immature Gran # (Auto)   (0.00-0.49)  10^3/uL


 


D-Dimer, Quantitative  0.64 H  (0.00-0.50)  


 


Sodium   (136-145)  mEq/L


 


Potassium   (3.5-5.0)  mEq/L


 


Chloride   ()  mEq/L


 


Carbon Dioxide   (21-32)  mmol/L


 


BUN   (7-18)  mg/dL


 


Creatinine   (0.7-1.3)  mg/dL


 


Est Cr Clr Drug Dosing   


 


Estimated GFR (MDRD)   (>=60)  mL/min


 


Glucose   (75-99)  mg/dL


 


Calcium   (8.4-10.1)  mg/dL


 


Total Bilirubin   (0.0-1.0)  mg/dL


 


AST   (15-37)  U/L


 


ALT   (12-78)  U/L


 


Alkaline Phosphatase   ()  U/L


 


Lactate Dehydrogenase   (100-190)  U/L


 


Creatine Kinase   ()  U/L


 


Troponin I High Sens   (<=76)  pg/mL


 


C-Reactive Protein   (0.2-0.8)  mg/dL


 


Total Protein   (6.4-8.2)  g/dL


 


Albumin   (3.4-5.0)  g/dL


 


Urine Color   (YELLOW)  


 


Urine Appearance   (CLEAR)  


 


Urine pH   (4.5-8.0)  


 


Ur Specific Gravity   (1.003-1.020)  


 


Urine Protein   (NEGATIVE)  mg/dL


 


Urine Glucose (UA)   (NEGATIVE)  mg/dL


 


Urine Ketones   (NEGATIVE)  mg/dL


 


Urine Occult Blood   (NEGATIVE)  


 


Urine Nitrite   (NEGATIVE)  


 


Urine Bilirubin   (NEGATIVE)  


 


Urine Urobilinogen   (0.2-1.0)  EU/dL


 


Ur Leukocyte Esterase   (NEGATIVE)  


 


U Hyaline Cast (Auto)   (NOT SEEN)  /LPF


 


Urine RBC   (0-5)  /HPF


 


Urine WBC   (0-5)  /HPF


 


Urine Bacteria   (NOT SEEN)  /HPF


 


Urine Mucus   (NOT SEEN)  /HPF


 


Urinalysis Comment   














- Re-Assessments/Exams


Free Text/Narrative Re-Assessment/Exam: 





10/24/21 


Labs are all essentially unremarkable.  BUN/creatinine are mildly elevated 

compared to normal. Urine shows high specific gravity.  EKG atrial fib.  

Discussed with patient.  Likely syncope related to heat/dehydration and 

paroxysmal atrial fib.  Offered IV fluids.  Declined.  States is able to push 

fluids.  Informed to notify DR. Cantrell.  





Departure





- Departure


Time of Disposition: 12:40


Disposition: Home, Self-Care 01


Condition: Good


Clinical Impression: 


 Syncope





Atrial fibrillation


Qualifiers:


 Atrial fibrillation type: paroxysmal Qualified Code(s): I48.0 - Paroxysmal 

atrial fibrillation








- Discharge Information


*PRESCRIPTION DRUG MONITORING PROGRAM REVIEWED*: No


*COPY OF PRESCRIPTION DRUG MONITORING REPORT IN PATIENT EMANUEL: No


Instructions:  Syncope, Easy-to-Read


Referrals: 


PCP,None [Primary Care Provider] - 


Forms:  ED Department Discharge


Additional Instructions: 


1.  Push fluids


2.  Slow cautious movements


3.  Contact Dr. Cantrell's office tomorrow to inform of situation


4.  Follow up if any recurring events or concerns.





Sepsis Event Note (ED)





- Evaluation


Sepsis Screening Result: No Definite Risk





- Focused Exam


Vital Signs: 


                                   Vital Signs











  Temp Pulse Resp BP Pulse Ox


 


 10/24/21 11:08  97.8 F  69  16  114/79  95














- My Orders


Last 24 Hours: 


My Active Orders





10/24/21 11:11


Chest 2V [CR] Stat 


Head wo Cont [CT] Stat 














- Assessment/Plan


Last 24 Hours: 


My Active Orders





10/24/21 11:11


Chest 2V [CR] Stat 


Head wo Cont [CT] Stat

## 2021-12-18 NOTE — OR
DATE OF OPERATION:  12/17/2021

 

PREOPERATIVE DIAGNOSIS:  HISTORY OF POLYPS.

 

POSTOPERATIVE DIAGNOSIS:  HISTORY OF POLYPS.

 

SURGEON:  Migue Willard MD

 

PROCEDURE:  FULL-LENGTH COLONOSCOPY WITH SNARE POLYPECTOMY X3, FORCEPS POLYP

REMOVAL X1.

 

ANESTHESIA:  MAC.

 

COMPLICATIONS:  None.

 

SPECIMEN:  Four small tubular adenomas.

 

RECOMMENDATIONS:  Followup colonoscopy in 3 years.

 

INDICATIONS:  Mr. Barnett has multiple colonoscopies with polyps removed in the

past.  He is due for a routine surveillance scope.

 

DESCRIPTION OF PROCEDURE:  The patient was prepped and draped and placed in the

left lateral decubitus position.  A lubricated Olympus colonoscope was inserted,

and with relative ease, advanced to the cecum.  The patient was quite tortuous

and had a long colon.  We were able to get into the cecal pouch and visualize

the ileocecal valve and appendiceal orifice.  The bowel prep was adequate.  Upon

withdrawal, the patient again had a small area of a polyp recurrence with kind

of a bilobed villous lesion in the cecal pouch.  We did remove it with snare in

2 separate sections and suctioned into polyp trap #1.  The patient also had 2

small sessile polyps in the proximal to mid ascending colon, one removed with a

snare, the second with a forceps.  The rest of the ascending and transverse

colons were benign.  The descending colon was unremarkable.  In the sigmoid

colon, the patient had 2 more small sessile polyps, each removed with a snare

and suctioned into polyp trap #3 without difficulty.  The rest of the sigmoid

colon was benign.  Rectal vault

appeared unremarkable.  Retroflexion showed no perianal lesions.  Air was

suctioned.  Scope removed without complication.

 

MARTHA/YOSHI

DD:  12/17/2021 11:00:29

DT:  12/17/2021 12:41:58

Job #:  [id number]